# Patient Record
Sex: MALE | Race: WHITE | URBAN - METROPOLITAN AREA
[De-identification: names, ages, dates, MRNs, and addresses within clinical notes are randomized per-mention and may not be internally consistent; named-entity substitution may affect disease eponyms.]

---

## 2019-10-18 ENCOUNTER — APPOINTMENT (RX ONLY)
Dept: URBAN - METROPOLITAN AREA CLINIC 117 | Facility: CLINIC | Age: 49
Setting detail: DERMATOLOGY
End: 2019-10-18

## 2019-10-18 DIAGNOSIS — N62 HYPERTROPHY OF BREAST: ICD-10-CM

## 2019-10-18 PROCEDURE — 99202 OFFICE O/P NEW SF 15 MIN: CPT

## 2019-10-18 PROCEDURE — ? COUNSELING - GYNECOMASTIA

## 2019-10-18 NOTE — HPI: BREAST (GYNECOMASTIA)
Which Breast(S) Are You Concerned About?: bilateral breasts
Which Side(S)?: both breasts
How Severe Is The Gynecomastia?: moderate
Is This A New Presentation, Or A Follow-Up?: Gynecomastia
Additional History: Patient states he history of extra breast tissue since he was a child. He has had small cystic nodules in the breast tissue that have been monitored in the past. He also has psychological embarrassment from the extra breast tissue.

## 2020-02-07 ENCOUNTER — APPOINTMENT (RX ONLY)
Dept: URBAN - METROPOLITAN AREA CLINIC 117 | Facility: CLINIC | Age: 50
Setting detail: DERMATOLOGY
End: 2020-02-07

## 2020-02-07 DIAGNOSIS — N62 HYPERTROPHY OF BREAST: ICD-10-CM

## 2020-02-07 PROCEDURE — ? PRE-OP WORKLIST

## 2020-02-07 PROCEDURE — 99212 OFFICE O/P EST SF 10 MIN: CPT

## 2020-02-07 NOTE — HPI: PREOPERATIVE APPOINTMENT
Has The Patient Completed Required Laboratory Testing?: has completed laboratory testing
Has The Patient Completed Informed Consent?: is scheduled to complete informed consent documentation during this visit
Has The Patient Fulfilled Financial Responsibilities For Surgical Scheduling?: has agreed to a proposed payment plan and the terms and conditions have been furnished to the satisfaction of the patient (or other financially responsible party)
Date Of Procedure?: 02/14/2020
Facility: [facility]
Surgeon: Dr. Sonja Owens
Assistant Planned (If Appropriate): Trav Pearson
Describe The Procedure In Detail: Gynecomastia

## 2020-02-07 NOTE — PROCEDURE: PRE-OP WORKLIST
Surgery Scheduled: Yes
Date Of Surgery: 02/14/2020
Admission Status: outpatient
Date Of Evaluation: 02/07/2020
Detail Level: Simple
Surgeon: Dr. Natan Godoy

## 2020-02-17 ENCOUNTER — APPOINTMENT (RX ONLY)
Dept: URBAN - METROPOLITAN AREA CLINIC 117 | Facility: CLINIC | Age: 50
Setting detail: DERMATOLOGY
End: 2020-02-17

## 2020-02-17 DIAGNOSIS — Z48.89 ENCOUNTER FOR OTHER SPECIFIED SURGICAL AFTERCARE: ICD-10-CM

## 2020-02-17 PROCEDURE — 99024 POSTOP FOLLOW-UP VISIT: CPT

## 2020-02-17 PROCEDURE — ? COUNSELING - POST-OP CHECK, GYNECOMASTIA REDUCTION

## 2020-02-17 ASSESSMENT — LOCATION DETAILED DESCRIPTION DERM
LOCATION DETAILED: LEFT LATERAL INFERIOR CHEST
LOCATION DETAILED: RIGHT MEDIAL INFERIOR CHEST
LOCATION DETAILED: RIGHT LATERAL INFERIOR CHEST
LOCATION DETAILED: LEFT MEDIAL INFERIOR CHEST

## 2020-02-17 ASSESSMENT — LOCATION SIMPLE DESCRIPTION DERM: LOCATION SIMPLE: CHEST

## 2020-02-17 ASSESSMENT — LOCATION ZONE DERM: LOCATION ZONE: TRUNK

## 2020-02-17 NOTE — HPI: BREAST (GYNECOMASTIA)
Which Breast(S) Are You Concerned About?: bilateral breasts
How Severe Is The Gynecomastia?: moderate
Is This A New Presentation, Or A Follow-Up?: Follow Up Gynecomastia
Since Your Previous Visit, Your Gynecomastia Is:: stable
Additional History: Patient is here for his post operative visit for bilateral gynecomastia
Date?: 02/14/2020

## 2020-02-25 ENCOUNTER — APPOINTMENT (RX ONLY)
Dept: URBAN - METROPOLITAN AREA CLINIC 117 | Facility: CLINIC | Age: 50
Setting detail: DERMATOLOGY
End: 2020-02-25

## 2020-02-25 ENCOUNTER — RX ONLY (OUTPATIENT)
Age: 50
Setting detail: RX ONLY
End: 2020-02-25

## 2020-02-25 DIAGNOSIS — Z48.89 ENCOUNTER FOR OTHER SPECIFIED SURGICAL AFTERCARE: ICD-10-CM

## 2020-02-25 PROBLEM — K21.9 GASTRO-ESOPHAGEAL REFLUX DISEASE WITHOUT ESOPHAGITIS: Status: ACTIVE | Noted: 2020-02-25

## 2020-02-25 PROBLEM — E13.9 OTHER SPECIFIED DIABETES MELLITUS WITHOUT COMPLICATIONS: Status: ACTIVE | Noted: 2020-02-25

## 2020-02-25 PROCEDURE — ? COUNSELING - POST-OP CHECK, GYNECOMASTIA REDUCTION

## 2020-02-25 PROCEDURE — 10160 PNXR ASPIR ABSC HMTMA BULLA: CPT | Mod: NC

## 2020-02-25 PROCEDURE — 99024 POSTOP FOLLOW-UP VISIT: CPT

## 2020-02-25 PROCEDURE — ? NEEDLE ASPIRATION

## 2020-02-25 ASSESSMENT — LOCATION DETAILED DESCRIPTION DERM
LOCATION DETAILED: RIGHT LATERAL INFERIOR CHEST
LOCATION DETAILED: LEFT LATERAL INFERIOR CHEST

## 2020-02-25 ASSESSMENT — LOCATION ZONE DERM: LOCATION ZONE: TRUNK

## 2020-02-25 ASSESSMENT — LOCATION SIMPLE DESCRIPTION DERM: LOCATION SIMPLE: CHEST

## 2020-02-25 NOTE — PROCEDURE: NEEDLE ASPIRATION
Anesthesia Type: 1% lidocaine with epinephrine
Consent: Informed consent was obtained and the patient verbalized full understanding. The risks, benefits, expectations and alternatives were reviewed in detail, including, but not limited to, the risks of delayed wound healing, infection, need for multiple incisions and drainages, recurrence, bleeding, injury to underlying structures (including nerves, devices, or organs), need for additional procedures, and pain.
Volume Aspirated In Cc (Optional): Stef[address] Rowell
Detail Level: Detailed
Method: sterile needle
Volume Aspirated In Cc (Optional): [address]
Lesion Type: Seroma
Anesthesia Volume In Cc: 1

## 2020-02-25 NOTE — HPI: POST-OP CHECK, GYNECOMASTIA REDUCTION (EXTENDED)
Where Is Your Surgical Site To Be Checked?: both breasts
What Best Describes The Level Of Symmetry? (Check All That Apply): fair
How Severe Is Your Pain?: 4 out of 10
How Is Your Wound Healing?: healing well
Compared To The Last Evaluation, How Have The Findings Changed?: stable
What Is Your Overall Satisfaction Level With The Right Breast?: satisfied
Date Of Procedure: 02/14/20
Additional History: Patient states right breast swelled on Sunday, as well as left breast began leaking,

## 2020-03-06 ENCOUNTER — RX ONLY (OUTPATIENT)
Age: 50
Setting detail: RX ONLY
End: 2020-03-06

## 2020-03-06 ENCOUNTER — APPOINTMENT (RX ONLY)
Dept: URBAN - METROPOLITAN AREA CLINIC 117 | Facility: CLINIC | Age: 50
Setting detail: DERMATOLOGY
End: 2020-03-06

## 2020-03-06 DIAGNOSIS — Z48.89 ENCOUNTER FOR OTHER SPECIFIED SURGICAL AFTERCARE: ICD-10-CM

## 2020-03-06 PROCEDURE — 10160 PNXR ASPIR ABSC HMTMA BULLA: CPT | Mod: NC

## 2020-03-06 PROCEDURE — 99024 POSTOP FOLLOW-UP VISIT: CPT

## 2020-03-06 PROCEDURE — ? NEEDLE ASPIRATION

## 2020-03-06 ASSESSMENT — LOCATION ZONE DERM: LOCATION ZONE: TRUNK

## 2020-03-06 ASSESSMENT — LOCATION SIMPLE DESCRIPTION DERM: LOCATION SIMPLE: CHEST

## 2020-03-06 ASSESSMENT — LOCATION DETAILED DESCRIPTION DERM: LOCATION DETAILED: LEFT LATERAL INFERIOR CHEST

## 2020-03-06 NOTE — PROCEDURE: NEEDLE ASPIRATION
Consent: Informed consent was obtained and the patient verbalized full understanding. The risks, benefits, expectations and alternatives were reviewed in detail, including, but not limited to, the risks of delayed wound healing, infection, need for multiple incisions and drainages, recurrence, bleeding, injury to underlying structures (including nerves, devices, or organs), need for additional procedures, and pain.
Lesion Type: Seroma
Body Location Override (Optional): left breast
Method: sterile needle
Detail Level: Detailed
Volume Aspirated In Cc (Optional): 25
Anesthesia Volume In Cc: 0.5
Anesthesia Type: 1% lidocaine with epinephrine

## 2022-06-04 ENCOUNTER — TELEPHONE ENCOUNTER (OUTPATIENT)
Dept: URBAN - METROPOLITAN AREA CLINIC 68 | Facility: CLINIC | Age: 52
End: 2022-06-04

## 2022-06-05 ENCOUNTER — TELEPHONE ENCOUNTER (OUTPATIENT)
Dept: URBAN - METROPOLITAN AREA CLINIC 68 | Facility: CLINIC | Age: 52
End: 2022-06-05

## 2022-06-05 RX ORDER — TRAMADOL HYDROCHLORIDE 50 MG/1
TRAMADOL HCL( 50MG ORAL  AS DIRECTED ) ACTIVE -HX ENTRY TABLET ORAL AS DIRECTED
Status: ACTIVE | COMMUNITY
Start: 2016-03-15

## 2022-06-05 RX ORDER — PANTOPRAZOLE SODIUM 20 MG/1
PANTOPRAZOLE SODIUM( 20MG ORAL  AS DIRECTED ) ACTIVE -HX ENTRY TABLET, DELAYED RELEASE ORAL AS DIRECTED
Status: ACTIVE | COMMUNITY
Start: 2016-03-15

## 2022-06-05 RX ORDER — LISINOPRIL 10 MG/1
LISINOPRIL( 10MG ORAL  AS DIRECTED ) ACTIVE -HX ENTRY TABLET ORAL AS DIRECTED
Status: ACTIVE | COMMUNITY
Start: 2016-03-15

## 2022-06-05 RX ORDER — METOPROLOL TARTRATE 100 MG/1
METOPROLOL TARTRATE( 100MG ORAL  AS DIRECTED ) ACTIVE -HX ENTRY TABLET, FILM COATED ORAL AS DIRECTED
Status: ACTIVE | COMMUNITY
Start: 2016-03-15

## 2022-06-25 ENCOUNTER — TELEPHONE ENCOUNTER (OUTPATIENT)
Age: 52
End: 2022-06-25

## 2022-06-26 ENCOUNTER — TELEPHONE ENCOUNTER (OUTPATIENT)
Age: 52
End: 2022-06-26

## 2022-06-26 RX ORDER — METOPROLOL TARTRATE 100 MG/1
METOPROLOL TARTRATE( 100MG ORAL  AS DIRECTED ) ACTIVE -HX ENTRY TABLET, FILM COATED ORAL AS DIRECTED
Status: ACTIVE | COMMUNITY
Start: 2016-03-15

## 2022-06-26 RX ORDER — PANTOPRAZOLE 20 MG/1
PANTOPRAZOLE SODIUM( 20MG ORAL  AS DIRECTED ) ACTIVE -HX ENTRY TABLET, DELAYED RELEASE ORAL AS DIRECTED
Status: ACTIVE | COMMUNITY
Start: 2016-03-15

## 2022-06-26 RX ORDER — LISINOPRIL 10 MG/1
LISINOPRIL( 10MG ORAL  AS DIRECTED ) ACTIVE -HX ENTRY TABLET ORAL AS DIRECTED
Status: ACTIVE | COMMUNITY
Start: 2016-03-15

## 2022-06-26 RX ORDER — TRAMADOL HYDROCHLORIDE 50 MG/1
TRAMADOL HCL( 50MG ORAL  AS DIRECTED ) ACTIVE -HX ENTRY TABLET ORAL AS DIRECTED
Status: ACTIVE | COMMUNITY
Start: 2016-03-15

## 2022-06-26 RX ORDER — CHOLECALCIFEROL (VITAMIN D3) 25 MCG
VITAMIN D( 1000UNIT ORAL  DAILY ) ACTIVE -HX ENTRY TABLET ORAL DAILY
Status: ACTIVE | COMMUNITY
Start: 2016-03-15